# Patient Record
Sex: MALE | Race: WHITE | NOT HISPANIC OR LATINO | Employment: FULL TIME | URBAN - METROPOLITAN AREA
[De-identification: names, ages, dates, MRNs, and addresses within clinical notes are randomized per-mention and may not be internally consistent; named-entity substitution may affect disease eponyms.]

---

## 2019-08-08 ENCOUNTER — APPOINTMENT (OUTPATIENT)
Dept: GENERAL RADIOLOGY | Facility: CLINIC | Age: 29
End: 2019-08-08
Attending: EMERGENCY MEDICINE
Payer: COMMERCIAL

## 2019-08-08 ENCOUNTER — APPOINTMENT (OUTPATIENT)
Dept: ULTRASOUND IMAGING | Facility: CLINIC | Age: 29
End: 2019-08-08
Attending: EMERGENCY MEDICINE
Payer: COMMERCIAL

## 2019-08-08 ENCOUNTER — HOSPITAL ENCOUNTER (EMERGENCY)
Facility: CLINIC | Age: 29
Discharge: HOME | End: 2019-08-08
Attending: EMERGENCY MEDICINE | Admitting: EMERGENCY MEDICINE
Payer: COMMERCIAL

## 2019-08-08 VITALS
OXYGEN SATURATION: 100 % | DIASTOLIC BLOOD PRESSURE: 73 MMHG | RESPIRATION RATE: 16 BRPM | SYSTOLIC BLOOD PRESSURE: 112 MMHG | TEMPERATURE: 98.2 F | HEART RATE: 79 BPM

## 2019-08-08 DIAGNOSIS — N20.0 KIDNEY STONE: ICD-10-CM

## 2019-08-08 DIAGNOSIS — R10.9 FLANK PAIN: ICD-10-CM

## 2019-08-08 LAB
ALBUMIN SERPL-MCNC: 4.7 G/DL (ref 3.4–5)
ALBUMIN UR-MCNC: 20 MG/DL
ALP SERPL-CCNC: 97 U/L (ref 40–150)
ALT SERPL W P-5'-P-CCNC: 17 U/L (ref 0–70)
ANION GAP SERPL CALCULATED.3IONS-SCNC: 5 MMOL/L (ref 3–14)
APPEARANCE UR: CLEAR
AST SERPL W P-5'-P-CCNC: 12 U/L (ref 0–45)
BASOPHILS # BLD AUTO: 0 10E9/L (ref 0–0.2)
BASOPHILS NFR BLD AUTO: 0.4 %
BILIRUB SERPL-MCNC: 0.4 MG/DL (ref 0.2–1.3)
BILIRUB UR QL STRIP: NEGATIVE
BUN SERPL-MCNC: 16 MG/DL (ref 7–30)
CALCIUM SERPL-MCNC: 9.2 MG/DL (ref 8.5–10.1)
CHLORIDE SERPL-SCNC: 106 MMOL/L (ref 94–109)
CO2 SERPL-SCNC: 29 MMOL/L (ref 20–32)
COLOR UR AUTO: YELLOW
CREAT SERPL-MCNC: 0.84 MG/DL (ref 0.66–1.25)
DIFFERENTIAL METHOD BLD: NORMAL
EOSINOPHIL # BLD AUTO: 0.1 10E9/L (ref 0–0.7)
EOSINOPHIL NFR BLD AUTO: 0.9 %
ERYTHROCYTE [DISTWIDTH] IN BLOOD BY AUTOMATED COUNT: 11.9 % (ref 10–15)
GFR SERPL CREATININE-BSD FRML MDRD: >90 ML/MIN/{1.73_M2}
GLUCOSE SERPL-MCNC: 108 MG/DL (ref 70–99)
GLUCOSE UR STRIP-MCNC: NEGATIVE MG/DL
HCT VFR BLD AUTO: 47.4 % (ref 40–53)
HGB BLD-MCNC: 15.4 G/DL (ref 13.3–17.7)
HGB UR QL STRIP: ABNORMAL
IMM GRANULOCYTES # BLD: 0 10E9/L (ref 0–0.4)
IMM GRANULOCYTES NFR BLD: 0.4 %
KETONES UR STRIP-MCNC: NEGATIVE MG/DL
LEUKOCYTE ESTERASE UR QL STRIP: NEGATIVE
LIPASE SERPL-CCNC: 111 U/L (ref 73–393)
LYMPHOCYTES # BLD AUTO: 1.3 10E9/L (ref 0.8–5.3)
LYMPHOCYTES NFR BLD AUTO: 23.1 %
MCH RBC QN AUTO: 30.2 PG (ref 26.5–33)
MCHC RBC AUTO-ENTMCNC: 32.5 G/DL (ref 31.5–36.5)
MCV RBC AUTO: 93 FL (ref 78–100)
MONOCYTES # BLD AUTO: 0.4 10E9/L (ref 0–1.3)
MONOCYTES NFR BLD AUTO: 6.5 %
MUCOUS THREADS #/AREA URNS LPF: PRESENT /LPF
NEUTROPHILS # BLD AUTO: 3.8 10E9/L (ref 1.6–8.3)
NEUTROPHILS NFR BLD AUTO: 68.7 %
NITRATE UR QL: NEGATIVE
NRBC # BLD AUTO: 0 10*3/UL
NRBC BLD AUTO-RTO: 0 /100
PH UR STRIP: 5.5 PH (ref 5–7)
PLATELET # BLD AUTO: 165 10E9/L (ref 150–450)
POTASSIUM SERPL-SCNC: 3.6 MMOL/L (ref 3.4–5.3)
PROT SERPL-MCNC: 8.5 G/DL (ref 6.8–8.8)
RBC # BLD AUTO: 5.1 10E12/L (ref 4.4–5.9)
RBC #/AREA URNS AUTO: 3 /HPF (ref 0–2)
SODIUM SERPL-SCNC: 140 MMOL/L (ref 133–144)
SOURCE: ABNORMAL
SP GR UR STRIP: 1.03 (ref 1–1.03)
UROBILINOGEN UR STRIP-MCNC: NORMAL MG/DL (ref 0–2)
WBC # BLD AUTO: 5.6 10E9/L (ref 4–11)
WBC #/AREA URNS AUTO: 2 /HPF (ref 0–5)

## 2019-08-08 PROCEDURE — 85025 COMPLETE CBC W/AUTO DIFF WBC: CPT | Performed by: EMERGENCY MEDICINE

## 2019-08-08 PROCEDURE — 74018 RADEX ABDOMEN 1 VIEW: CPT

## 2019-08-08 PROCEDURE — 83690 ASSAY OF LIPASE: CPT | Performed by: EMERGENCY MEDICINE

## 2019-08-08 PROCEDURE — 99285 EMERGENCY DEPT VISIT HI MDM: CPT | Mod: 25

## 2019-08-08 PROCEDURE — 80053 COMPREHEN METABOLIC PANEL: CPT | Performed by: EMERGENCY MEDICINE

## 2019-08-08 PROCEDURE — 96361 HYDRATE IV INFUSION ADD-ON: CPT

## 2019-08-08 PROCEDURE — 81001 URINALYSIS AUTO W/SCOPE: CPT | Performed by: EMERGENCY MEDICINE

## 2019-08-08 PROCEDURE — 96375 TX/PRO/DX INJ NEW DRUG ADDON: CPT

## 2019-08-08 PROCEDURE — 25000128 H RX IP 250 OP 636: Performed by: EMERGENCY MEDICINE

## 2019-08-08 PROCEDURE — 76775 US EXAM ABDO BACK WALL LIM: CPT

## 2019-08-08 PROCEDURE — 96374 THER/PROPH/DIAG INJ IV PUSH: CPT

## 2019-08-08 RX ORDER — ONDANSETRON 2 MG/ML
4 INJECTION INTRAMUSCULAR; INTRAVENOUS
Status: COMPLETED | OUTPATIENT
Start: 2019-08-08 | End: 2019-08-08

## 2019-08-08 RX ORDER — MORPHINE SULFATE 4 MG/ML
4 INJECTION, SOLUTION INTRAMUSCULAR; INTRAVENOUS
Status: DISCONTINUED | OUTPATIENT
Start: 2019-08-08 | End: 2019-08-08 | Stop reason: HOSPADM

## 2019-08-08 RX ORDER — HYDROCODONE BITARTRATE AND ACETAMINOPHEN 5; 325 MG/1; MG/1
1 TABLET ORAL EVERY 6 HOURS PRN
Qty: 10 TABLET | Refills: 0 | Status: SHIPPED | OUTPATIENT
Start: 2019-08-08 | End: 2022-05-31

## 2019-08-08 RX ORDER — TAMSULOSIN HYDROCHLORIDE 0.4 MG/1
0.4 CAPSULE ORAL DAILY
Qty: 3 CAPSULE | Refills: 0 | Status: SHIPPED | OUTPATIENT
Start: 2019-08-08 | End: 2019-08-11

## 2019-08-08 RX ORDER — SODIUM CHLORIDE 9 MG/ML
1000 INJECTION, SOLUTION INTRAVENOUS CONTINUOUS
Status: DISCONTINUED | OUTPATIENT
Start: 2019-08-08 | End: 2019-08-08 | Stop reason: HOSPADM

## 2019-08-08 RX ADMIN — SODIUM CHLORIDE 1000 ML: 9 INJECTION, SOLUTION INTRAVENOUS at 10:23

## 2019-08-08 RX ADMIN — MORPHINE SULFATE 4 MG: 4 INJECTION INTRAVENOUS at 10:26

## 2019-08-08 RX ADMIN — ONDANSETRON 4 MG: 2 INJECTION INTRAMUSCULAR; INTRAVENOUS at 10:26

## 2019-08-08 ASSESSMENT — ENCOUNTER SYMPTOMS
CONSTIPATION: 0
FEVER: 0
DIARRHEA: 0
DIFFICULTY URINATING: 0
FLANK PAIN: 1
NAUSEA: 1
HEMATURIA: 0

## 2019-08-08 NOTE — ED PROVIDER NOTES
History     Chief Complaint:    Flank Pain      HPI   Oz Rabago is a 29 year old male who presents to the ED for evaluation of flank pain. The patient states that he developed left sided flank/back pain 2-3 hours prior to presenting consistent with his previous kidney stone. His pain persisted and caused him to be nauseated which ultimately prompted his visit to the ED. He otherwise denies any difficulty urinating, hematuria, cough, cold symptoms, fever, diarrhea, or constipation. He notes that he took some medication that started with a 'K' (ketorolac?) that his coworker gave him for kidney pain.     Allergies:  Penicillins     Medications:    The patient is currently on no regular medications.    Past Medical History:    Kidney stone    Past Surgical History:    Hernia repair    Family History:    Mother: HLD    Social History:  Negative for tobacco use.  Negative for alcohol use.     Review of Systems   Constitutional: Negative for fever.   Gastrointestinal: Positive for nausea. Negative for constipation and diarrhea.   Genitourinary: Positive for flank pain. Negative for difficulty urinating and hematuria.   All other systems reviewed and are negative.        Physical Exam   First Vitals:  BP: (!) 128/92  Pulse: 87  Heart Rate: 83  Temp: 98.2  F (36.8  C)  Resp: 16  SpO2: 99 %      Physical Exam  General: The patient is alert, in no respiratory distress. Sitting uncomfortably in the bed    HENT: Mucous membranes moist.    Cardiovascular: Regular rate and rhythm. Good pulses in all four extremities. Normal capillary refill and skin turgor.     Respiratory: Lungs are clear. No nasal flaring. No retractions. No wheezing, no crackles.    Gastrointestinal: Abdomen soft. No guarding, no rebound. No palpable hernias. No abdominal tenderness    Musculoskeletal: No gross deformity.     Skin: No rashes or petechiae.     Neurologic: The patient is alert and oriented x3. GCS 15. No testable cranial nerve deficit.  Follows commands with clear and appropriate speech. Gives appropriate answers. Good strength in all extremities. No gross neurologic deficit. Gross sensation intact. Pupils are round and reactive. No meningismus.     Lymphatic: No cervical adenopathy. No lower extremity swelling.    Psychiatric: The patient is non-tearful.  Emergency Department Course   Imaging:  Radiographic findings were communicated with the patient who voiced understanding of the findings.  XR Abdomen 1 View:   IMPRESSION: No abnormal calcifications project over the kidneys or  expected course of the ureters. Pelvic calcifications appear to  represent phleboliths. Moderate retained stool. Nonobstructive gas  pattern. Bones are unremarkable as per radiology.     US Renal Limited:  IMPRESSION: Mild left hydronephrosis. No obvious evidence of renal  calculi as per radiology.    Laboratory:  CBC: WBC: 5.6, HGB: 15.4, PLT: 165  CMP: Glucose 108 (H), o/w WNL (Creatinine: 0.84)    UA with Microscopic: Blood: small, , o/w WNL  Lipase: 111    Interventions:  1023 NS 1,000 mLs IV  1026 Morphine 4 mg IV   Zofran 4 mg IV    Emergency Department Course:  Nursing notes and vitals reviewed. (1005) I performed an exam of the patient as documented above. Discussed CT vs X-ray/US.    IV inserted. Medicine administered as documented above. Blood drawn. This was sent to the lab for further testing, results above.     The patient was sent for a abdomen x-ray and renal US while in the emergency department, findings above.     The patient provided a urine sample here in the emergency department. This was sent for laboratory testing, findings above.     1155 I rechecked the patient and discussed the results of his workup thus far. Patient reports that he thinks he passed the stone.     Findings and plan explained to the Patient. Patient discharged home with instructions regarding supportive care, medications, and reasons to return. The importance of close follow-up was  reviewed. The patient was prescribed Norco and Flomax.     I personally reviewed the laboratory results with the Patient and answered all related questions prior to discharge.   Impression & Plan    Medical Decision Making:  The patient has had a previous kidney stone and his current symptoms are similar. He reports dysuria with it. Urine shows a few RBCs. I held on CT scan as he has had one previously. I do not think this is likely a dissection, aneurysm, perforation, or diverticulitis. His US does show mild hydro. He said he did feel the stinging pass into the bladder, therefore he has likely passed the stone. I did give a prescription for Norco and Flomax in case this is incorrect and he was discharged home in good condition and feeling much better.       Diagnosis:    ICD-10-CM    1. Flank pain R10.9    2. Kidney stone N20.0        Disposition:  discharged to home    Discharge Medications:     Medication List      Started    HYDROcodone-acetaminophen 5-325 MG tablet  Commonly known as:  NORCO  1 tablet, Oral, EVERY 6 HOURS PRN     tamsulosin 0.4 MG capsule  Commonly known as:  FLOMAX  0.4 mg, Oral, DAILY          Scribe Disclosure:  I,  Nigel Villalobos, am serving as a scribe on 8/8/2019 at 10:05 AM to personally document services performed by Kendrick Davis MD based on my observations and the provider's statements to me.        Nigel Villalobos  8/8/2019   [facility] EMERGENCY DEPARTMENT       Kendrick Davis MD  08/08/19 [id number]

## 2019-08-08 NOTE — DISCHARGE INSTRUCTIONS
Discharge Instructions  Kidney Stones    Kidney stones are a common problem that can cause a lot of pain but fortunately are usually not dangerous and can be generally treated with medicine at home.  However, sometimes your condition may be worse than it seemed at first, or may get worse with time.     You need to follow-up with your regular doctor within 3 days.    Most kidney stones will pass on their own, but occasionally stones may need to be removed by an urologist. We will send you home with a urine strainer. Be sure to urinate into this, or urinate into a container and pour the urine through the fine filter to catch the kidney stone as it comes out. The stone will seem like a pebble or grain of sand. Be sure to save this in a Ziploc  bag and take it to the doctor s office with you.       Return to the Emergency Department if:  Your pain is not controlled.  You are vomiting and can t keep fluids or medications down.  You develop fever (>101).  You feel much more ill or develop new symptoms.  What can I do to help myself?  Be sure to drink plenty of fluids.  Staying active is good, and may help the stone to pass. You may do whatever you feel up to doing without restrictions.   Treatment:  Non-steroidal anti-inflammatory drugs (NSAIDs). This includes prescription medicines like Toradol  (ketorolac) and non-prescription medicines like Advil  (ibuprofen) and Nuprin  (ibuprofen). These pain relievers are very effective for kidney stones.  Narcotic pain pills. If you have been given a narcotic such as Vicodin  (hydrocodone with acetaminophen), Percocet  (oxycodone with acetaminophen), or codeine, do not drive for four hours after you have taken it. If the narcotic contains Tylenol  (acetaminophen), do not take Tylenol  with it. All narcotics will cause constipation, so eat a high fiber diet.    Nausea medication.  Nausea and vomiting are common with kidney stones, so your physician may send you home with medicine  for this.   Flomax  (tamsulosin). This medicine is sometimes used for men with prostate problems, but also can help kidney stones to pass. This medicine can lower blood pressure, and you may feel faint, especially when you first stand up. Be sure to get up gradually, sit down if you feel faint, and avoid activity where feeling faint would be dangerous, such as climbing ladders.   If you were given a prescription for medicine here today, be sure to read all of the information (including the package insert) that comes with your prescription.  This will include important information about the medicine, its side effects, and any warnings that you need to know about.  The pharmacist who fills the prescription can provide more information and answer questions you may have about the medicine.  If you have questions or concerns that the pharmacist cannot address, please call or return to the Emergency Department.   Opioid Medication Information    Pain medications are among the most commonly prescribed medicines, so we are including this information for all our patients. If you did not receive pain medication or get a prescription for pain medicine, you can ignore it.     You may have been given a prescription for an opioid (narcotic) pain medicine and/or have received a pain medicine while here in the Emergency Department. These medicines can make you drowsy or impaired. You must not drive, operate dangerous equipment, or engage in any other dangerous activities while taking these medications. If you drive while taking these medications, you could be arrested for DUI, or driving under the influence. Do not drink any alcohol while you are taking these medications.     Opioid pain medications can cause addiction. If you have a history of chemical dependency of any type, you are at a higher risk of becoming addicted to pain medications.  Only take these prescribed medications to treat your pain when all other options have  been tried. Take it for as short a time and as few doses as possible. Store your pain pills in a secure place, as they are frequently stolen and provide a dangerous opportunity for children or visitors in your house to start abusing these powerful medications. We will not replace any lost or stolen medicine.  As soon as your pain is better, you should flush all your remaining medication.     Many prescription pain medications contain Tylenol  (acetaminophen), including Vicodin , Tylenol #3 , Norco , Lortab , and Percocet .  You should not take any extra pills of Tylenol  if you are using these prescription medications or you can get very sick.  Do not ever take more than 3000 mg of acetaminophen in any 24 hour period.    All opioids tend to cause constipation. Drink plenty of water and eat foods that have a lot of fiber, such as fruits, vegetables, prune juice, apple juice and high fiber cereal.  Take a laxative if you don t move your bowels at least every other day. Miralax , Milk of Magnesia, Colace , or Senna  can be used to keep you regular.      Remember that you can always come back to the Emergency Department if you are not able to see your regular doctor in the amount of time listed above, if you get any new symptoms, or if there is anything that worries you.

## 2019-08-08 NOTE — ED TRIAGE NOTES
Pt with L flank pain for past few hours. Nausea, no vomiting or dysuria. Hx kidney stones, feels similar. ABC intact.

## 2019-08-08 NOTE — ED AVS SNAPSHOT
[facility]  [address]  [facility] [address]  Phone:  [phone]  Fax:  [phone]                                    Oz Rabago   MRN: [id number]    Department:  [facility]   Date of Visit:  8/8/2019           After Visit Summary Signature Page    I have received my discharge instructions, and my questions have been answered. I have discussed any challenges I see with this plan with the nurse or doctor.    ..........................................................................................................................................  Patient/Patient Representative Signature      ..........................................................................................................................................  Patient Representative Print Name and Relationship to Patient    ..................................................               ................................................  Date                                   Time    ..........................................................................................................................................  Reviewed by Signature/Title    ...................................................              ..............................................  Date                                               Time          22EPIC Rev 08/18

## 2019-09-10 DIAGNOSIS — R10.9 FLANK PAIN: Primary | ICD-10-CM

## 2019-09-10 ASSESSMENT — ENCOUNTER SYMPTOMS
SKIN CHANGES: 0
FLANK PAIN: 1
DIFFICULTY URINATING: 0
POOR WOUND HEALING: 1
DYSURIA: 0
HEMATURIA: 0
NAIL CHANGES: 0

## 2019-09-11 ENCOUNTER — OFFICE VISIT (OUTPATIENT)
Dept: UROLOGY | Facility: CLINIC | Age: 29
End: 2019-09-11
Payer: COMMERCIAL

## 2019-09-11 VITALS
HEART RATE: 67 BPM | DIASTOLIC BLOOD PRESSURE: 74 MMHG | SYSTOLIC BLOOD PRESSURE: 118 MMHG | WEIGHT: 136 LBS | HEIGHT: 70 IN | OXYGEN SATURATION: 98 % | BODY MASS INDEX: 19.47 KG/M2

## 2019-09-11 DIAGNOSIS — R10.9 FLANK PAIN: ICD-10-CM

## 2019-09-11 DIAGNOSIS — N20.0 KIDNEY STONE: Primary | ICD-10-CM

## 2019-09-11 DIAGNOSIS — N20.0 RECURRENT NEPHROLITHIASIS: ICD-10-CM

## 2019-09-11 LAB
ALBUMIN UR-MCNC: NEGATIVE MG/DL
APPEARANCE UR: CLEAR
BILIRUB UR QL STRIP: NEGATIVE
COLOR UR AUTO: YELLOW
GLUCOSE UR STRIP-MCNC: NEGATIVE MG/DL
HGB UR QL STRIP: NEGATIVE
KETONES UR STRIP-MCNC: NEGATIVE MG/DL
LEUKOCYTE ESTERASE UR QL STRIP: NEGATIVE
NITRATE UR QL: NEGATIVE
PH UR STRIP: 7 PH (ref 5–7)
SOURCE: NORMAL
SP GR UR STRIP: 1.02 (ref 1–1.03)
UROBILINOGEN UR STRIP-ACNC: 1 EU/DL (ref 0.2–1)

## 2019-09-11 PROCEDURE — 82365 CALCULUS SPECTROSCOPY: CPT | Mod: 90 | Performed by: UROLOGY

## 2019-09-11 PROCEDURE — 99000 SPECIMEN HANDLING OFFICE-LAB: CPT | Performed by: UROLOGY

## 2019-09-11 PROCEDURE — 99203 OFFICE O/P NEW LOW 30 MIN: CPT | Performed by: UROLOGY

## 2019-09-11 PROCEDURE — 81003 URINALYSIS AUTO W/O SCOPE: CPT | Performed by: UROLOGY

## 2019-09-11 ASSESSMENT — MIFFLIN-ST. JEOR: SCORE: 1593.14

## 2019-09-11 ASSESSMENT — PAIN SCALES - GENERAL: PAINLEVEL: NO PAIN (0)

## 2019-09-11 NOTE — LETTER
Date:September 12, 2019      Patient was self referred, no letter generated. Do not send.        [facility] Health Information

## 2019-09-11 NOTE — NURSING NOTE
"   Chief Complaint   Patient presents with     Hx of Stone     Patient her for follow after ED Visit       Blood pressure 118/74, pulse 67, height 1.778 m (5' 10\"), weight 61.7 kg (136 lb), SpO2 98 %. Body mass index is 19.51 kg/m .    There is no problem list on file for this patient.      Allergies   Allergen Reactions     Penicillins Rash       Current Outpatient Medications   Medication Sig Dispense Refill     HYDROcodone-acetaminophen (NORCO) 5-325 MG tablet Take 1 tablet by mouth every 6 hours as needed for severe pain (Patient not taking: Reported on 9/11/2019) 10 tablet 0       Social History     Tobacco Use     Smoking status: Never Smoker     Smokeless tobacco: Never Used   Substance Use Topics     Alcohol use: Not on file     Drug use: Not on file       NESSA Kelly  9/11/2019         "

## 2019-09-11 NOTE — LETTER
9/11/2019       RE: Oz Rabago  [address]  [facility] [address]     Dear Colleague,    Thank you for referring your patient, Oz Rabago, to the [facility] [place] at [facility]. Please see a copy of my visit note below.    Urology Consult History and Physical  [facility]  Name: Oz Rabago    MRN: [id number]   YOB: 1990       We were asked to see Oz Rabago at the request of ER follow up - SELF for evaluation and treatment of kidney stones.        Chief Complaint:   Kidney stones    History is obtained from the patient            History of Present Illness:   Oz Rabago is a 29 year old male who is being seen for evaluation of Kidney stones.     LEFT flank pain started on 8/8/19 at 6am and last for about 5 hours. He passed small stones while in the ER. U/S completed with mild left hydro, KUB without evidence of stone. He passed a few small - 1mm fragments which he brought in today.  No pain or issues since that time.     Passed a 8mm stone ~2 years ago. He also think he passed another stone prior. LEFT stone.   This was 95% Calcium phosphate              Past Medical History:   No past medical history on file.         Past Surgical History:   No past surgical history on file.         Social History:     Social History     Tobacco Use     Smoking status: Never Smoker     Smokeless tobacco: Never Used   Substance Use Topics     Alcohol use: Not on file       History   Smoking Status     Never Smoker   Smokeless Tobacco     Never Used            Family History:   No family history on file.           Allergies:     Allergies   Allergen Reactions     Penicillins Rash            Medications:     Current Outpatient Medications   Medication Sig     HYDROcodone-acetaminophen (NORCO) 5-325 MG tablet Take 1 tablet by mouth every 6 hours as needed for severe pain (Patient not taking: Reported on 9/11/2019)     No current  "facility-administered medications for this visit.              Review of Systems:     Skin: negative  Eyes: negative  Ears/Nose/Throat: negative  Respiratory: No shortness of breath, dyspnea on exertion, cough, or hemoptysis  Cardiovascular: negative  Gastrointestinal: negative  Genitourinary: as above  Musculoskeletal: negative  Neurologic: negative  Psychiatric: negative  Hematologic/Lymphatic/Immunologic: negative  Endocrine: negative          Physical Exam:     Patient Vitals for the past 24 hrs:   BP Pulse SpO2 Height Weight   09/11/19 1616 118/74 67 98 % 1.778 m (5' 10\") 61.7 kg (136 lb)     Body mass index is 19.51 kg/m .     General: age-appropriate appearing male in NAD  HEENT: Head AT/NC, EOMI, CN Grossly intact  Lungs: no respiratory distress, or pursed lip breathing  Heart: No obvious jugular venous distension present  Back: no bony midline tenderness, no CVAT bilaterally.  Abdomen: soft, non-distended, non-tender. No organomegaly  : No costovertebral tenderness bilaterally   Lymph: no palpable inguinal lymphadenopathy.  LE: no edema.   Musculoskeltal: extremities normal, no peripheral edema  Skin: no suspicious lesions or rashes  Neuro: Alert, oriented, speech and mentation normal;  moving all 4 extremities equally.  Psych: affect and mood normal          Data:   All laboratory data reviewed:    UA RESULTS:  Recent Labs   Lab Test 09/11/19  1628 08/08/19  1119   COLOR Yellow Yellow   APPEARANCE Clear Clear   URINEGLC Negative Negative   URINEBILI Negative Negative   URINEKETONE Negative Negative   SG 1.020 1.032   UBLD Negative Small*   URINEPH 7.0 5.5   PROTEIN Negative 20*   UROBILINOGEN 1.0  --    NITRITE Negative Negative   LEUKEST Negative Negative   RBCU  --  3*   WBCU  --  2        Lab Results   Component Value Date    CR 0.84 08/08/2019        All pertinent imaging reviewed:    All imaging studies reviewed by me.  I personally reviewed these imaging films.  A formal report from radiology will " follow.    U/S RENAL 8/8/19:  FINDINGS: Ultrasound evaluation of the left kidney is performed.  Kidney is normal in size measuring 11.6 x 5.7 x 5.6 cm. Renal cortical  echogenicity appears within normal limits. No obvious renal calculi.  Mild fluid distention of the renal pelvis is noted along with the  calyces consistent with mild hydronephrosis. Bladder is decompressed  but grossly unremarkable.                                                                      IMPRESSION: Mild left hydronephrosis. No obvious evidence of renal  Calculi.    KUB 8/8/19:  IMPRESSION: No abnormal calcifications project over the kidneys or  expected course of the ureters. Pelvic calcifications appear to  represent phleboliths. Moderate retained stool. Nonobstructive gas  pattern. Bones are unremarkable.         Impression and Plan:   Impression:   29 year old man with history of LEFT kidney stones and recent LEFT flank pain episode with passage of small stone fragments       Plan:   Recurrent nephrolithiasis  - U/A today normal indicating very low likelihood of residual stone fragments  - Stone analysis on the fragments he collected  - We discussed metabolic work up and he is interested in this  - Return in 2-3 months following TWO 24-hour [employer]     Thank you for the kind consultation.    Time spent: 30 minutes of which >50% was spent counseling.    Obdulio Fox MD   Urology  [facility]  [facility] Phone: [phone]  [facility] Phone: [phone]         Again, thank you for allowing me to participate in the care of your patient.      Sincerely,    Obdulio Fxo MD

## 2019-09-11 NOTE — PROGRESS NOTES
Urology Consult History and Physical  [facility]  Name: Oz Rabago    MRN: [id number]   YOB: 1990       We were asked to see Oz Rabago at the request of ER follow up - SELF for evaluation and treatment of kidney stones.        Chief Complaint:   Kidney stones    History is obtained from the patient            History of Present Illness:   Oz Rabago is a 29 year old male who is being seen for evaluation of Kidney stones.     LEFT flank pain started on 8/8/19 at 6am and last for about 5 hours. He passed small stones while in the ER. U/S completed with mild left hydro, KUB without evidence of stone. He passed a few small - 1mm fragments which he brought in today.  No pain or issues since that time.     Passed a 8mm stone ~2 years ago. He also think he passed another stone prior. LEFT stone.   This was 95% Calcium phosphate              Past Medical History:   No past medical history on file.         Past Surgical History:   No past surgical history on file.         Social History:     Social History     Tobacco Use     Smoking status: Never Smoker     Smokeless tobacco: Never Used   Substance Use Topics     Alcohol use: Not on file       History   Smoking Status     Never Smoker   Smokeless Tobacco     Never Used            Family History:   No family history on file.           Allergies:     Allergies   Allergen Reactions     Penicillins Rash            Medications:     Current Outpatient Medications   Medication Sig     HYDROcodone-acetaminophen (NORCO) 5-325 MG tablet Take 1 tablet by mouth every 6 hours as needed for severe pain (Patient not taking: Reported on 9/11/2019)     No current facility-administered medications for this visit.              Review of Systems:     Skin: negative  Eyes: negative  Ears/Nose/Throat: negative  Respiratory: No shortness of breath, dyspnea on exertion, cough, or hemoptysis  Cardiovascular: negative  Gastrointestinal: negative  Genitourinary: as  "above  Musculoskeletal: negative  Neurologic: negative  Psychiatric: negative  Hematologic/Lymphatic/Immunologic: negative  Endocrine: negative          Physical Exam:     Patient Vitals for the past 24 hrs:   BP Pulse SpO2 Height Weight   09/11/19 1616 118/74 67 98 % 1.778 m (5' 10\") 61.7 kg (136 lb)     Body mass index is 19.51 kg/m .     General: age-appropriate appearing male in NAD  HEENT: Head AT/NC, EOMI, CN Grossly intact  Lungs: no respiratory distress, or pursed lip breathing  Heart: No obvious jugular venous distension present  Back: no bony midline tenderness, no CVAT bilaterally.  Abdomen: soft, non-distended, non-tender. No organomegaly  : No costovertebral tenderness bilaterally   Lymph: no palpable inguinal lymphadenopathy.  LE: no edema.   Musculoskeltal: extremities normal, no peripheral edema  Skin: no suspicious lesions or rashes  Neuro: Alert, oriented, speech and mentation normal;  moving all 4 extremities equally.  Psych: affect and mood normal          Data:   All laboratory data reviewed:    UA RESULTS:  Recent Labs   Lab Test 09/11/19  1628 08/08/19  1119   COLOR Yellow Yellow   APPEARANCE Clear Clear   URINEGLC Negative Negative   URINEBILI Negative Negative   URINEKETONE Negative Negative   SG 1.020 1.032   UBLD Negative Small*   URINEPH 7.0 5.5   PROTEIN Negative 20*   UROBILINOGEN 1.0  --    NITRITE Negative Negative   LEUKEST Negative Negative   RBCU  --  3*   WBCU  --  2        Lab Results   Component Value Date    CR 0.84 08/08/2019        All pertinent imaging reviewed:    All imaging studies reviewed by me.  I personally reviewed these imaging films.  A formal report from radiology will follow.    U/S RENAL 8/8/19:  FINDINGS: Ultrasound evaluation of the left kidney is performed.  Kidney is normal in size measuring 11.6 x 5.7 x 5.6 cm. Renal cortical  echogenicity appears within normal limits. No obvious renal calculi.  Mild fluid distention of the renal pelvis is noted along " with the  calyces consistent with mild hydronephrosis. Bladder is decompressed  but grossly unremarkable.                                                                      IMPRESSION: Mild left hydronephrosis. No obvious evidence of renal  Calculi.    KUB 8/8/19:  IMPRESSION: No abnormal calcifications project over the kidneys or  expected course of the ureters. Pelvic calcifications appear to  represent phleboliths. Moderate retained stool. Nonobstructive gas  pattern. Bones are unremarkable.         Impression and Plan:   Impression:   29 year old man with history of LEFT kidney stones and recent LEFT flank pain episode with passage of small stone fragments       Plan:   Recurrent nephrolithiasis  - U/A today normal indicating very low likelihood of residual stone fragments  - Stone analysis on the fragments he collected  - We discussed metabolic work up and he is interested in this  - Return in 2-3 months following TWO 24-hour [employer]ol[employer]     Thank you for the kind consultation.    Time spent: 30 minutes of which >50% was spent counseling.    Obdulio Fox MD   Urology  [facility]  [facility] Phone: [phone]  [facility] Phone: [phone]

## 2019-09-14 LAB
APPEARANCE STONE: NORMAL
COMPN STONE: NORMAL
NUMBER STONE: NORMAL
SIZE STONE: NORMAL MM
WT STONE: 2 MG

## 2019-10-08 ENCOUNTER — TRANSFERRED RECORDS (OUTPATIENT)
Dept: HEALTH INFORMATION MANAGEMENT | Facility: CLINIC | Age: 29
End: 2019-10-08

## 2020-03-11 ENCOUNTER — HEALTH MAINTENANCE LETTER (OUTPATIENT)
Age: 30
End: 2020-03-11

## 2021-01-03 ENCOUNTER — HEALTH MAINTENANCE LETTER (OUTPATIENT)
Age: 31
End: 2021-01-03

## 2021-04-25 ENCOUNTER — HEALTH MAINTENANCE LETTER (OUTPATIENT)
Age: 31
End: 2021-04-25

## 2021-10-10 ENCOUNTER — HEALTH MAINTENANCE LETTER (OUTPATIENT)
Age: 31
End: 2021-10-10

## 2022-05-21 ENCOUNTER — HEALTH MAINTENANCE LETTER (OUTPATIENT)
Age: 32
End: 2022-05-21

## 2022-05-31 ENCOUNTER — OFFICE VISIT (OUTPATIENT)
Dept: FAMILY MEDICINE | Facility: CLINIC | Age: 32
End: 2022-05-31
Payer: COMMERCIAL

## 2022-05-31 VITALS
HEART RATE: 79 BPM | RESPIRATION RATE: 16 BRPM | SYSTOLIC BLOOD PRESSURE: 120 MMHG | DIASTOLIC BLOOD PRESSURE: 77 MMHG | TEMPERATURE: 97.7 F | HEIGHT: 70 IN | BODY MASS INDEX: 19.9 KG/M2 | WEIGHT: 139 LBS

## 2022-05-31 DIAGNOSIS — Z11.3 SCREEN FOR STD (SEXUALLY TRANSMITTED DISEASE): ICD-10-CM

## 2022-05-31 PROCEDURE — 87389 HIV-1 AG W/HIV-1&-2 AB AG IA: CPT | Performed by: FAMILY MEDICINE

## 2022-05-31 PROCEDURE — 87491 CHLMYD TRACH DNA AMP PROBE: CPT | Performed by: FAMILY MEDICINE

## 2022-05-31 PROCEDURE — 86803 HEPATITIS C AB TEST: CPT | Performed by: FAMILY MEDICINE

## 2022-05-31 PROCEDURE — 86780 TREPONEMA PALLIDUM: CPT | Performed by: FAMILY MEDICINE

## 2022-05-31 PROCEDURE — 87591 N.GONORRHOEAE DNA AMP PROB: CPT | Performed by: FAMILY MEDICINE

## 2022-05-31 PROCEDURE — 36415 COLL VENOUS BLD VENIPUNCTURE: CPT | Performed by: FAMILY MEDICINE

## 2022-05-31 PROCEDURE — 99203 OFFICE O/P NEW LOW 30 MIN: CPT | Performed by: FAMILY MEDICINE

## 2022-05-31 NOTE — PROGRESS NOTES
"  Assessment & Plan   See after visit summary and result note from studies for helpful information and advice given to patient.    Screen for STD (sexually transmitted disease)    - HIV Antigen Antibody Combo  - Hepatitis C Screen Reflex to HCV RNA Quant and Genotype  - Chlamydia trachomatis/Neisseria gonorrhoeae by PCR  - Treponema Abs w Reflex to RPR and Titer  - HIV Antigen Antibody Combo  - Hepatitis C Screen Reflex to HCV RNA Quant and Genotype                   Return in about 3 months (around 8/31/2022) for Routine preventive, with me, with any available provider.    Naldo Johnson DO  [facility] Clinton is a 32 year old who presents for the following health issues     STD    History of Present Illness       Reason for visit:  Std screening    He eats 2-3 servings of fruits and vegetables daily.He consumes 0 sweetened beverage(s) daily.He exercises with enough effort to increase his heart rate 9 or less minutes per day.  He exercises with enough effort to increase his heart rate 5 days per week.   He is taking medications regularly.             Review of Systems   Patient is seen for primary reason of wanting STD screening done.    Patient had urethral discomfort after voiding once about 10 days ago. This, plus never having had STD screening done brought him to clinic today.     No urinary concerns today.  No other physical or mental health concerns at time of exam.        Objective    /77 (BP Location: Right arm, Patient Position: Chair, Cuff Size: Adult Regular)   Pulse 79   Temp 97.7  F (36.5  C) (Oral)   Resp 16   Ht 1.778 m (5' 10\")   Wt 63 kg (139 lb)   BMI 19.94 kg/m    Body mass index is 19.94 kg/m .  Physical Exam   Vital signs reviewed.  Patient is in no acute appearing distress.  Breathing appears nonlabored.  Patient is alert and oriented ×3.  Patient is very pleasant, making good eye contact and responding with clear fluent speech.    Heart: Heart " rate is regular without murmur.    Lungs: Lungs are clear to auscultation with good airflow bilaterally.    Genital exam: No visible skin abnormalities.  No urethral discharge noted. No inguinal hernia palpated while standing during a cough.  No rash or abnormal skin or glans penis lesions noted.

## 2022-06-01 LAB
HCV AB SERPL QL IA: NONREACTIVE
HIV 1+2 AB+HIV1 P24 AG SERPL QL IA: NONREACTIVE
T PALLIDUM AB SER QL: NONREACTIVE

## 2022-06-02 LAB
C TRACH DNA SPEC QL PROBE+SIG AMP: NEGATIVE
N GONORRHOEA DNA SPEC QL NAA+PROBE: NEGATIVE

## 2022-06-04 ENCOUNTER — APPOINTMENT (OUTPATIENT)
Dept: ULTRASOUND IMAGING | Facility: CLINIC | Age: 32
End: 2022-06-04
Attending: EMERGENCY MEDICINE
Payer: COMMERCIAL

## 2022-06-04 ENCOUNTER — APPOINTMENT (OUTPATIENT)
Dept: CT IMAGING | Facility: CLINIC | Age: 32
End: 2022-06-04
Attending: EMERGENCY MEDICINE
Payer: COMMERCIAL

## 2022-06-04 ENCOUNTER — HOSPITAL ENCOUNTER (OUTPATIENT)
Facility: CLINIC | Age: 32
Setting detail: OBSERVATION
Discharge: HOME | End: 2022-06-05
Attending: EMERGENCY MEDICINE | Admitting: INTERNAL MEDICINE
Payer: COMMERCIAL

## 2022-06-04 DIAGNOSIS — N28.9 KIDNEY LESION, NATIVE, RIGHT: ICD-10-CM

## 2022-06-04 DIAGNOSIS — R10.9 FLANK PAIN: Primary | ICD-10-CM

## 2022-06-04 DIAGNOSIS — N20.1 URETEROLITHIASIS: ICD-10-CM

## 2022-06-04 LAB
ALBUMIN UR-MCNC: 20 MG/DL
AMORPH CRY #/AREA URNS HPF: ABNORMAL /HPF
ANION GAP SERPL CALCULATED.3IONS-SCNC: 7 MMOL/L (ref 3–14)
APPEARANCE UR: ABNORMAL
BACTERIA #/AREA URNS HPF: ABNORMAL /HPF
BASOPHILS # BLD AUTO: 0 10E3/UL (ref 0–0.2)
BASOPHILS NFR BLD AUTO: 0 %
BILIRUB UR QL STRIP: NEGATIVE
BUN SERPL-MCNC: 9 MG/DL (ref 7–30)
CALCIUM SERPL-MCNC: 8.9 MG/DL (ref 8.5–10.1)
CHLORIDE BLD-SCNC: 107 MMOL/L (ref 94–109)
CO2 SERPL-SCNC: 26 MMOL/L (ref 20–32)
COLOR UR AUTO: YELLOW
CREAT SERPL-MCNC: 0.71 MG/DL (ref 0.66–1.25)
EOSINOPHIL # BLD AUTO: 0 10E3/UL (ref 0–0.7)
EOSINOPHIL NFR BLD AUTO: 0 %
ERYTHROCYTE [DISTWIDTH] IN BLOOD BY AUTOMATED COUNT: 12.8 % (ref 10–15)
GFR SERPL CREATININE-BSD FRML MDRD: >90 ML/MIN/1.73M2
GLUCOSE BLD-MCNC: 130 MG/DL (ref 70–99)
GLUCOSE UR STRIP-MCNC: NEGATIVE MG/DL
HCT VFR BLD AUTO: 43.3 % (ref 40–53)
HGB BLD-MCNC: 14.1 G/DL (ref 13.3–17.7)
HGB UR QL STRIP: ABNORMAL
HOLD SPECIMEN: NORMAL
HOLD SPECIMEN: NORMAL
IMM GRANULOCYTES # BLD: 0 10E3/UL
IMM GRANULOCYTES NFR BLD: 0 %
KETONES UR STRIP-MCNC: 40 MG/DL
LEUKOCYTE ESTERASE UR QL STRIP: NEGATIVE
LYMPHOCYTES # BLD AUTO: 0.9 10E3/UL (ref 0.8–5.3)
LYMPHOCYTES NFR BLD AUTO: 12 %
MCH RBC QN AUTO: 30.6 PG (ref 26.5–33)
MCHC RBC AUTO-ENTMCNC: 32.6 G/DL (ref 31.5–36.5)
MCV RBC AUTO: 94 FL (ref 78–100)
MONOCYTES # BLD AUTO: 0.5 10E3/UL (ref 0–1.3)
MONOCYTES NFR BLD AUTO: 7 %
MUCOUS THREADS #/AREA URNS LPF: PRESENT /LPF
NEUTROPHILS # BLD AUTO: 5.9 10E3/UL (ref 1.6–8.3)
NEUTROPHILS NFR BLD AUTO: 81 %
NITRATE UR QL: NEGATIVE
NRBC # BLD AUTO: 0 10E3/UL
NRBC BLD AUTO-RTO: 0 /100
PH UR STRIP: 7.5 [PH] (ref 5–7)
PLATELET # BLD AUTO: 141 10E3/UL (ref 150–450)
POTASSIUM BLD-SCNC: 3.7 MMOL/L (ref 3.4–5.3)
RBC # BLD AUTO: 4.61 10E6/UL (ref 4.4–5.9)
RBC URINE: 16 /HPF
SARS-COV-2 RNA RESP QL NAA+PROBE: NEGATIVE
SODIUM SERPL-SCNC: 140 MMOL/L (ref 133–144)
SP GR UR STRIP: 1.02 (ref 1–1.03)
UROBILINOGEN UR STRIP-MCNC: NORMAL MG/DL
WBC # BLD AUTO: 7.4 10E3/UL (ref 4–11)
WBC URINE: 0 /HPF

## 2022-06-04 PROCEDURE — G0378 HOSPITAL OBSERVATION PER HR: HCPCS

## 2022-06-04 PROCEDURE — 74176 CT ABD & PELVIS W/O CONTRAST: CPT

## 2022-06-04 PROCEDURE — 99220 PR INITIAL OBSERVATION CARE,LEVEL III: CPT | Performed by: NURSE PRACTITIONER

## 2022-06-04 PROCEDURE — 96374 THER/PROPH/DIAG INJ IV PUSH: CPT

## 2022-06-04 PROCEDURE — 85014 HEMATOCRIT: CPT | Performed by: EMERGENCY MEDICINE

## 2022-06-04 PROCEDURE — 96376 TX/PRO/DX INJ SAME DRUG ADON: CPT

## 2022-06-04 PROCEDURE — 250N000011 HC RX IP 250 OP 636: Performed by: EMERGENCY MEDICINE

## 2022-06-04 PROCEDURE — 76770 US EXAM ABDO BACK WALL COMP: CPT

## 2022-06-04 PROCEDURE — 99285 EMERGENCY DEPT VISIT HI MDM: CPT | Mod: 25

## 2022-06-04 PROCEDURE — 36415 COLL VENOUS BLD VENIPUNCTURE: CPT | Performed by: EMERGENCY MEDICINE

## 2022-06-04 PROCEDURE — 258N000003 HC RX IP 258 OP 636: Performed by: NURSE PRACTITIONER

## 2022-06-04 PROCEDURE — C9803 HOPD COVID-19 SPEC COLLECT: HCPCS

## 2022-06-04 PROCEDURE — 250N000013 HC RX MED GY IP 250 OP 250 PS 637: Performed by: NURSE PRACTITIONER

## 2022-06-04 PROCEDURE — U0005 INFEC AGEN DETEC AMPLI PROBE: HCPCS | Performed by: EMERGENCY MEDICINE

## 2022-06-04 PROCEDURE — 96375 TX/PRO/DX INJ NEW DRUG ADDON: CPT

## 2022-06-04 PROCEDURE — 96361 HYDRATE IV INFUSION ADD-ON: CPT

## 2022-06-04 PROCEDURE — 258N000003 HC RX IP 258 OP 636: Performed by: EMERGENCY MEDICINE

## 2022-06-04 PROCEDURE — 81001 URINALYSIS AUTO W/SCOPE: CPT | Performed by: EMERGENCY MEDICINE

## 2022-06-04 PROCEDURE — 82310 ASSAY OF CALCIUM: CPT | Performed by: EMERGENCY MEDICINE

## 2022-06-04 RX ORDER — KETOROLAC TROMETHAMINE 15 MG/ML
15 INJECTION, SOLUTION INTRAMUSCULAR; INTRAVENOUS ONCE
Status: COMPLETED | OUTPATIENT
Start: 2022-06-04 | End: 2022-06-04

## 2022-06-04 RX ORDER — OXYCODONE HYDROCHLORIDE 5 MG/1
5-10 TABLET ORAL EVERY 4 HOURS PRN
Status: DISCONTINUED | OUTPATIENT
Start: 2022-06-04 | End: 2022-06-05 | Stop reason: HOSPADM

## 2022-06-04 RX ORDER — ONDANSETRON 2 MG/ML
4 INJECTION INTRAMUSCULAR; INTRAVENOUS EVERY 6 HOURS PRN
Status: DISCONTINUED | OUTPATIENT
Start: 2022-06-04 | End: 2022-06-05 | Stop reason: HOSPADM

## 2022-06-04 RX ORDER — TAMSULOSIN HYDROCHLORIDE 0.4 MG/1
0.4 CAPSULE ORAL DAILY
Status: DISCONTINUED | OUTPATIENT
Start: 2022-06-04 | End: 2022-06-05 | Stop reason: HOSPADM

## 2022-06-04 RX ORDER — SODIUM CHLORIDE 9 MG/ML
INJECTION, SOLUTION INTRAVENOUS CONTINUOUS
Status: DISCONTINUED | OUTPATIENT
Start: 2022-06-04 | End: 2022-06-05 | Stop reason: HOSPADM

## 2022-06-04 RX ORDER — NALOXONE HYDROCHLORIDE 0.4 MG/ML
0.2 INJECTION, SOLUTION INTRAMUSCULAR; INTRAVENOUS; SUBCUTANEOUS
Status: DISCONTINUED | OUTPATIENT
Start: 2022-06-04 | End: 2022-06-05 | Stop reason: HOSPADM

## 2022-06-04 RX ORDER — ACETAMINOPHEN 325 MG/1
650 TABLET ORAL EVERY 6 HOURS PRN
Status: DISCONTINUED | OUTPATIENT
Start: 2022-06-04 | End: 2022-06-05 | Stop reason: HOSPADM

## 2022-06-04 RX ORDER — KETOROLAC TROMETHAMINE 30 MG/ML
30 INJECTION, SOLUTION INTRAMUSCULAR; INTRAVENOUS EVERY 6 HOURS PRN
Status: DISCONTINUED | OUTPATIENT
Start: 2022-06-04 | End: 2022-06-05 | Stop reason: HOSPADM

## 2022-06-04 RX ORDER — NALOXONE HYDROCHLORIDE 0.4 MG/ML
0.4 INJECTION, SOLUTION INTRAMUSCULAR; INTRAVENOUS; SUBCUTANEOUS
Status: DISCONTINUED | OUTPATIENT
Start: 2022-06-04 | End: 2022-06-05 | Stop reason: HOSPADM

## 2022-06-04 RX ORDER — HYDROMORPHONE HCL IN WATER/PF 6 MG/30 ML
0.2 PATIENT CONTROLLED ANALGESIA SYRINGE INTRAVENOUS
Status: DISCONTINUED | OUTPATIENT
Start: 2022-06-04 | End: 2022-06-05 | Stop reason: HOSPADM

## 2022-06-04 RX ORDER — ONDANSETRON 2 MG/ML
4 INJECTION INTRAMUSCULAR; INTRAVENOUS EVERY 30 MIN PRN
Status: COMPLETED | OUTPATIENT
Start: 2022-06-04 | End: 2022-06-04

## 2022-06-04 RX ORDER — ONDANSETRON 4 MG/1
4 TABLET, ORALLY DISINTEGRATING ORAL EVERY 6 HOURS PRN
Status: DISCONTINUED | OUTPATIENT
Start: 2022-06-04 | End: 2022-06-05 | Stop reason: HOSPADM

## 2022-06-04 RX ORDER — AMOXICILLIN 250 MG
2 CAPSULE ORAL 2 TIMES DAILY
Status: DISCONTINUED | OUTPATIENT
Start: 2022-06-04 | End: 2022-06-05 | Stop reason: HOSPADM

## 2022-06-04 RX ORDER — ACETAMINOPHEN 650 MG/1
650 SUPPOSITORY RECTAL EVERY 6 HOURS PRN
Status: DISCONTINUED | OUTPATIENT
Start: 2022-06-04 | End: 2022-06-05 | Stop reason: HOSPADM

## 2022-06-04 RX ORDER — MORPHINE SULFATE 4 MG/ML
4 INJECTION, SOLUTION INTRAMUSCULAR; INTRAVENOUS
Status: COMPLETED | OUTPATIENT
Start: 2022-06-04 | End: 2022-06-04

## 2022-06-04 RX ORDER — AMOXICILLIN 250 MG
1 CAPSULE ORAL 2 TIMES DAILY
Status: DISCONTINUED | OUTPATIENT
Start: 2022-06-04 | End: 2022-06-05 | Stop reason: HOSPADM

## 2022-06-04 RX ADMIN — SODIUM CHLORIDE 1000 ML: 9 INJECTION, SOLUTION INTRAVENOUS at 11:55

## 2022-06-04 RX ADMIN — MORPHINE SULFATE 4 MG: 4 INJECTION INTRAVENOUS at 13:57

## 2022-06-04 RX ADMIN — SODIUM CHLORIDE: 9 INJECTION, SOLUTION INTRAVENOUS at 12:00

## 2022-06-04 RX ADMIN — ONDANSETRON 4 MG: 2 INJECTION INTRAMUSCULAR; INTRAVENOUS at 09:39

## 2022-06-04 RX ADMIN — SODIUM CHLORIDE 1000 ML: 9 INJECTION, SOLUTION INTRAVENOUS at 09:39

## 2022-06-04 RX ADMIN — OXYCODONE HYDROCHLORIDE 10 MG: 5 TABLET ORAL at 18:33

## 2022-06-04 RX ADMIN — ONDANSETRON 4 MG: 2 INJECTION INTRAMUSCULAR; INTRAVENOUS at 12:04

## 2022-06-04 RX ADMIN — MORPHINE SULFATE 4 MG: 4 INJECTION INTRAVENOUS at 11:55

## 2022-06-04 RX ADMIN — TAMSULOSIN HYDROCHLORIDE 0.4 MG: 0.4 CAPSULE ORAL at 17:26

## 2022-06-04 RX ADMIN — ONDANSETRON 4 MG: 2 INJECTION INTRAMUSCULAR; INTRAVENOUS at 13:57

## 2022-06-04 RX ADMIN — SODIUM CHLORIDE: 9 INJECTION, SOLUTION INTRAVENOUS at 20:21

## 2022-06-04 RX ADMIN — MORPHINE SULFATE 4 MG: 4 INJECTION INTRAVENOUS at 09:41

## 2022-06-04 RX ADMIN — KETOROLAC TROMETHAMINE 15 MG: 15 INJECTION, SOLUTION INTRAMUSCULAR; INTRAVENOUS at 09:40

## 2022-06-04 ASSESSMENT — ENCOUNTER SYMPTOMS
FLANK PAIN: 1
FEVER: 0
NAUSEA: 1
DYSURIA: 0
HEMATURIA: 0
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
## 2022-06-04 NOTE — ED TRIAGE NOTES
Pt c/o left flank pain that started about 0600.  Hx of kidney stones.   Also N/V.       Triage Assessment     Row Name 06/04/22 0827       Triage Assessment (Adult)    Airway WDL WDL       Respiratory WDL    Respiratory WDL WDL       Skin Circulation/Temperature WDL    Skin Circulation/Temperature WDL WDL       Cardiac WDL    Cardiac WDL WDL       Peripheral/Neurovascular WDL    Peripheral Neurovascular WDL WDL       Cognitive/Neuro/Behavioral WDL    Cognitive/Neuro/Behavioral WDL WDL
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
## 2022-06-04 NOTE — PHARMACY-ADMISSION MEDICATION HISTORY
Admission medication history interview status for this patient is complete. See [facility] admission navigator for allergy information, prior to admission medications and immunization status.     Medication history interview done, indicate source(s): Patient  Medication history resources (including written lists, pill bottles, clinic record): patient, Davida  Pharmacy: [facility]    Changes made to PTA medication list: None    Actions taken by pharmacist (provider contacted, etc):None     Additional medication history information:None    Medication reconciliation/reorder completed by provider prior to medication history?  N    Prior to Admission medications    Not on File

## 2022-06-04 NOTE — ED NOTES
[facility]  ED Nurse Handoff Report    Oz Rabago is a 32 year old male   ED Chief complaint: Flank Pain  . ED Diagnosis:   Final diagnoses:   Kidney lesion, native, right   Ureterolithiasis     Allergies:   Allergies   Allergen Reactions     Penicillins Rash       Code Status: Full Code  Activity level - Baseline/Home:  Independent. Activity Level - Current:   Independent. Lift room needed: No. Bariatric: No   Needed: No   Isolation: No. Infection: Not Applicable.     Vital Signs:   Vitals:    06/04/22 1215 06/04/22 1230 06/04/22 1330 06/04/22 1345   BP: 105/87 113/77 119/80    Pulse: 98 102  118   Resp: 10 12  20   Temp:       TempSrc:       SpO2: 96% 97%  100%   Weight:       Height:           Cardiac Rhythm:  ,      Pain level:    Patient confused: No. Patient Falls Risk: No.   Elimination Status: Has voided   Patient Report - Initial Complaint: Oz Rabago is a 32 year old male with history of renal calculi who presents with left flank pain. Per the patient, at work he began experiencing left flank pain accompanied by nausea which he describes as similar to his previous renal calculi. He denies fever as well as any testicular pain, dysuria or hematuria. He has had 3 previous calculi, 2 of which he passed and one required surgical intervention in hospital. Focused Assessment: C/o L flank pain and nausea, vomiting.   Tests Performed:   Abd/pelvis CT no contrast - Stone Protocol   Preliminary Result   IMPRESSION:    1.  4 mm stone at the left ureteropelvic junction with mild proximal hydronephrosis.   2.  1 mm nonobstructing stone in the lower left kidney.         US Renal Complete   Final Result   IMPRESSION:   1.  Mild dilatation of the left renal pelvis and proximal left ureter. No obstructing stone is visualized on this exam. Further evaluation with stone protocol CT may be helpful.   2.  Slightly complex likely benign upper right renal cortical cystic lesion. Recommend 6 month  ultrasound follow-up.      . Abnormal Results:   Labs Ordered and Resulted from Time of ED Arrival to Time of ED Departure   BASIC METABOLIC PANEL - Abnormal       Result Value    Sodium 140      Potassium 3.7      Chloride 107      Carbon Dioxide (CO2) 26      Anion Gap 7      Urea Nitrogen 9      Creatinine 0.71      Calcium 8.9      Glucose 130 (*)     GFR Estimate >90     ROUTINE UA WITH MICROSCOPIC REFLEX TO CULTURE - Abnormal    Color Urine Yellow      Appearance Urine Cloudy (*)     Glucose Urine Negative      Bilirubin Urine Negative      Ketones Urine 40  (*)     Specific Gravity Urine 1.023      Blood Urine Small (*)     pH Urine 7.5 (*)     Protein Albumin Urine 20  (*)     Urobilinogen Urine Normal      Nitrite Urine Negative      Leukocyte Esterase Urine Negative      Bacteria Urine Few (*)     Mucus Urine Present (*)     Amorphous Crystals Urine Few (*)     RBC Urine 16 (*)     WBC Urine 0     CBC WITH PLATELETS AND DIFFERENTIAL - Abnormal    WBC Count 7.4      RBC Count 4.61      Hemoglobin 14.1      Hematocrit 43.3      MCV 94      MCH 30.6      MCHC 32.6      RDW 12.8      Platelet Count 141 (*)     % Neutrophils 81      % Lymphocytes 12      % Monocytes 7      % Eosinophils 0      % Basophils 0      % Immature Granulocytes 0      NRBCs per 100 WBC 0      Absolute Neutrophils 5.9      Absolute Lymphocytes 0.9      Absolute Monocytes 0.5      Absolute Eosinophils 0.0      Absolute Basophils 0.0      Absolute Immature Granulocytes 0.0      Absolute NRBCs 0.0     .   Treatments provided: See MAR  Family Comments: wife at bedside for some of the time.   OBS brochure/video discussed/provided to patient:  Yes  ED Medications:   Medications   0.9% sodium chloride BOLUS (0 mLs Intravenous Stopped 6/4/22 1100)     Followed by   sodium chloride 0.9% infusion ( Intravenous New Bag 6/4/22 1200)   ondansetron (ZOFRAN) injection 4 mg (4 mg Intravenous Given 6/4/22 1357)   morphine (PF) injection 4 mg (4 mg  Intravenous Given 6/4/22 4377)   ketorolac (TORADOL) injection 15 mg (15 mg Intravenous Given 6/4/22 9447)     Drips infusing: YES  For the majority of the shift, the patient's behavior Green. Interventions performed were NA.    Sepsis treatment initiated: No     Patient tested for COVID 19 prior to admission: YES    ED Nurse Name/Phone Number: Leanne Escalante RN,   2:11 PM    RECEIVING UNIT ED HANDOFF REVIEW    Above ED Nurse Handoff Report was reviewed: Yes  Reviewed by: Merari Schulte RN on June 4, 2022 at 4:39 PM

## 2022-06-04 NOTE — PLAN OF CARE
ROOM # 213-02    Living Situation (if not independent, order SW consult): Independent   Facility name:   :  Sheryl Rabago Spouse [phone]     Activity level at baseline: Independent  Activity level on admit:  SBA    Who will be transporting you at discharge: Sheryl Potts Spouse [phone]       Patient registered to observation; given Patient Bill of Rights; given the opportunity to ask questions about observation status and their plan of care.  Patient has been oriented to the observation room, bathroom and call light is in place.    Discussed discharge goals and expectations with patient/family.

## 2022-06-05 VITALS
HEIGHT: 70 IN | TEMPERATURE: 97.9 F | HEART RATE: 76 BPM | SYSTOLIC BLOOD PRESSURE: 111 MMHG | DIASTOLIC BLOOD PRESSURE: 77 MMHG | RESPIRATION RATE: 16 BRPM | OXYGEN SATURATION: 99 % | WEIGHT: 139.6 LBS | BODY MASS INDEX: 19.98 KG/M2

## 2022-06-05 LAB
ANION GAP SERPL CALCULATED.3IONS-SCNC: 3 MMOL/L (ref 3–14)
BUN SERPL-MCNC: 6 MG/DL (ref 7–30)
CALCIUM SERPL-MCNC: 8.2 MG/DL (ref 8.5–10.1)
CHLORIDE BLD-SCNC: 109 MMOL/L (ref 94–109)
CO2 SERPL-SCNC: 28 MMOL/L (ref 20–32)
CREAT SERPL-MCNC: 0.66 MG/DL (ref 0.66–1.25)
ERYTHROCYTE [DISTWIDTH] IN BLOOD BY AUTOMATED COUNT: 12.9 % (ref 10–15)
GFR SERPL CREATININE-BSD FRML MDRD: >90 ML/MIN/1.73M2
GLUCOSE BLD-MCNC: 99 MG/DL (ref 70–99)
HCT VFR BLD AUTO: 38.9 % (ref 40–53)
HGB BLD-MCNC: 12.2 G/DL (ref 13.3–17.7)
MCH RBC QN AUTO: 30.5 PG (ref 26.5–33)
MCHC RBC AUTO-ENTMCNC: 31.4 G/DL (ref 31.5–36.5)
MCV RBC AUTO: 97 FL (ref 78–100)
PLATELET # BLD AUTO: 101 10E3/UL (ref 150–450)
POTASSIUM BLD-SCNC: 4 MMOL/L (ref 3.4–5.3)
RBC # BLD AUTO: 4 10E6/UL (ref 4.4–5.9)
SODIUM SERPL-SCNC: 140 MMOL/L (ref 133–144)
WBC # BLD AUTO: 6 10E3/UL (ref 4–11)

## 2022-06-05 PROCEDURE — G0378 HOSPITAL OBSERVATION PER HR: HCPCS

## 2022-06-05 PROCEDURE — 85014 HEMATOCRIT: CPT | Performed by: NURSE PRACTITIONER

## 2022-06-05 PROCEDURE — 250N000013 HC RX MED GY IP 250 OP 250 PS 637: Performed by: NURSE PRACTITIONER

## 2022-06-05 PROCEDURE — 258N000003 HC RX IP 258 OP 636: Performed by: NURSE PRACTITIONER

## 2022-06-05 PROCEDURE — 80048 BASIC METABOLIC PNL TOTAL CA: CPT | Performed by: NURSE PRACTITIONER

## 2022-06-05 PROCEDURE — 36415 COLL VENOUS BLD VENIPUNCTURE: CPT | Performed by: NURSE PRACTITIONER

## 2022-06-05 PROCEDURE — 99217 PR OBSERVATION CARE DISCHARGE: CPT | Performed by: PHYSICIAN ASSISTANT

## 2022-06-05 PROCEDURE — 96361 HYDRATE IV INFUSION ADD-ON: CPT

## 2022-06-05 RX ORDER — ACETAMINOPHEN 325 MG/1
650 TABLET ORAL EVERY 6 HOURS PRN
Start: 2022-06-05

## 2022-06-05 RX ORDER — TAMSULOSIN HYDROCHLORIDE 0.4 MG/1
0.4 CAPSULE ORAL DAILY
Qty: 7 CAPSULE | Refills: 0 | Status: SHIPPED | OUTPATIENT
Start: 2022-06-05 | End: 2022-06-12

## 2022-06-05 RX ADMIN — TAMSULOSIN HYDROCHLORIDE 0.4 MG: 0.4 CAPSULE ORAL at 08:36

## 2022-06-05 RX ADMIN — SODIUM CHLORIDE: 9 INJECTION, SOLUTION INTRAVENOUS at 03:09

## 2022-06-05 NOTE — PLAN OF CARE
"PRIMARY DIAGNOSIS: LEFT URETERAL STONE    OUTPATIENT/OBSERVATION GOALS TO BE MET BEFORE DISCHARGE  1. Pain Status: Pain free.    2. Tolerating adequate PO diet: Yes    3. Surgical Intervention planned: No    4. Cleared by consultants (if involved): No    5. Return to near baseline physical activity: Yes    Discharge Planner Nurse   Safe discharge environment identified: Yes  Barriers to discharge: Yes       Entered by: Malachi Parekh RN 06/05/2022 12:42 AM  /77 (BP Location: Left arm)   Pulse 97   Temp 97.9  F (36.6  C) (Oral)   Resp 16   Ht 1.778 m (5' 10\")   Wt 63.3 kg (139 lb 9.6 oz)   SpO2 99%   BMI 20.03 kg/m    VSS on RA. AxOx4 and able to make needs known. Ambulating in room independently. Tolerating clear liquids. NS running @ 150mL/hr. Manish pain. Plan to monitor for stone passing, administer IVF and ADAT while managing pain and nausea. Pt is agreeable w/ POC and resting peacefully. Will continue to provide supportive cares.  Please review provider order for any additional goals.   Nurse to notify provider when observation goals have been met and patient is ready for discharge.  "

## 2022-06-05 NOTE — PLAN OF CARE
"PRIMARY DIAGNOSIS: LEFT URETERAL STONE    OUTPATIENT/OBSERVATION GOALS TO BE MET BEFORE DISCHARGE  1. Pain Status: Pain free.    2. Tolerating adequate PO diet: Yes, Clears ADAT    3. Surgical Intervention planned: No    4. Cleared by consultants (if involved): No    5. Return to near baseline physical activity: Yes    Discharge Planner Nurse   Safe discharge environment identified: Yes  Barriers to discharge: Yes       Entered by: Malachi Parekh RN 06/04/2022 8:35 PM  /81 (BP Location: Right arm)   Pulse 89   Temp 97.9  F (36.6  C) (Oral)   Resp 12   Ht 1.778 m (5' 10\")   Wt 63.3 kg (139 lb 9.6 oz)   SpO2 100%   BMI 20.03 kg/m    VSS on RA. AxOx4 and able to make needs known. Ambulating in room independently. Tolerating clear liquids. NS running @ 150mL/hr. Manish pain. Pt reported that he felt he had passed a stone, writer witnessed an approx 1mm redish white dot in the strainer. Writer attempted to collect sample but was unsuccessful as matter passed through strainer. Pt stated that he no longer experienced left flank pain/pressure, will ocntinue to monitor. Plan to monitor for stone passing, administer IVF and ADAT while managing pain and nausea. Pt and wife are agreeable w/ POC. Will continue to provide supportive cares.  Please review provider order for any additional goals.   Nurse to notify provider when observation goals have been met and patient is ready for discharge.  "

## 2022-06-05 NOTE — PLAN OF CARE
Patient's After Visit Summary was reviewed with patient and/or spouse.   Patient verbalized understanding of After Visit Summary, recommended follow up and was given an opportunity to ask questions.   Discharge medications sent home with patient/family: No (E-scribed to patient's pharmacy of choice)  Discharged with spouse.    IV access discontinued. All personal belongings returned.     OBSERVATION patient END time: 10:00 AM

## 2022-06-05 NOTE — PLAN OF CARE
"PRIMARY DIAGNOSIS: LEFT URETERAL STONE    OUTPATIENT/OBSERVATION GOALS TO BE MET BEFORE DISCHARGE  1. Pain Status: Pain free.    2. Tolerating adequate PO diet: Yes    3. Surgical Intervention planned: No    4. Cleared by consultants (if involved): No    5. Return to near baseline physical activity: Yes    Discharge Planner Nurse   Safe discharge environment identified: Yes  Barriers to discharge: Yes       Entered by: Malachi Parekh RN 06/05/2022 3:31 AM  /77 (BP Location: Right arm)   Pulse 87   Temp 97.9  F (36.6  C) (Oral)   Resp 16   Ht 1.778 m (5' 10\")   Wt 63.3 kg (139 lb 9.6 oz)   SpO2 97%   BMI 20.03 kg/m    VSS on RA. AxOx4 and able to make needs known. Ambulating in room independently. Tolerating clear liquids. NS running @ 150mL/hr. Denies pain or nausea. Voiding w/o difficulty w/ moderate output (see Flowsheets). Plan to monitor for stone passing, administer IVF and ADAT while managing pain and nausea. Pt is agreeable w/ POC and resting peacefully. Will continue to provide supportive cares.  Please review provider order for any additional goals.   Nurse to notify provider when observation goals have been met and patient is ready for discharge.  "

## 2022-06-05 NOTE — PLAN OF CARE
"PRIMARY DIAGNOSIS: LEFT URETERAL STONE     OUTPATIENT/OBSERVATION GOALS TO BE MET BEFORE DISCHARGE  1. Pain Status: Pain free.     2. Tolerating adequate PO diet: Yes     3. Surgical Intervention planned: No     4. Cleared by consultants (if involved): N/A     5. Return to near baseline physical activity: Yes     Discharge Planner Nurse   Safe discharge environment identified: Yes  Barriers to discharge: No  /77 (BP Location: Right arm)   Pulse 76   Temp 97.9  F (36.6  C) (Oral)   Resp 16   Ht 1.778 m (5' 10\")   Wt 63.3 kg (139 lb 9.6 oz)   SpO2 99%   BMI 20.03 kg/m    A&Ox4. Up independently. BS active x4, tolerating clear liquids, passing flatus. Denies nausea, states he has an appetite this AM. Denies pain. Voiding in adequate amt's. Plan to discharge to home today.   Please review provider order for any additional goals.   Nurse to notify provider when observation goals have been met and patient is ready for discharge.  "
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
## 2022-09-18 ENCOUNTER — HEALTH MAINTENANCE LETTER (OUTPATIENT)
Age: 32
End: 2022-09-18

## 2023-06-04 ENCOUNTER — HEALTH MAINTENANCE LETTER (OUTPATIENT)
Age: 33
End: 2023-06-04

## 2024-05-09 ENCOUNTER — APPOINTMENT (OUTPATIENT)
Dept: GENERAL RADIOLOGY | Facility: CLINIC | Age: 34
End: 2024-05-09
Attending: EMERGENCY MEDICINE
Payer: COMMERCIAL

## 2024-05-09 ENCOUNTER — APPOINTMENT (OUTPATIENT)
Dept: ULTRASOUND IMAGING | Facility: CLINIC | Age: 34
End: 2024-05-09
Attending: EMERGENCY MEDICINE
Payer: COMMERCIAL

## 2024-05-09 ENCOUNTER — HOSPITAL ENCOUNTER (EMERGENCY)
Facility: CLINIC | Age: 34
Discharge: HOME | End: 2024-05-09
Attending: EMERGENCY MEDICINE | Admitting: EMERGENCY MEDICINE
Payer: COMMERCIAL

## 2024-05-09 VITALS
DIASTOLIC BLOOD PRESSURE: 61 MMHG | WEIGHT: 162 LBS | HEART RATE: 80 BPM | OXYGEN SATURATION: 96 % | TEMPERATURE: 98 F | HEIGHT: 69 IN | SYSTOLIC BLOOD PRESSURE: 99 MMHG | BODY MASS INDEX: 23.99 KG/M2 | RESPIRATION RATE: 16 BRPM

## 2024-05-09 DIAGNOSIS — N20.0 NEPHROLITHIASIS: ICD-10-CM

## 2024-05-09 DIAGNOSIS — R10.9 LEFT FLANK PAIN: ICD-10-CM

## 2024-05-09 LAB
ALBUMIN UR-MCNC: 10 MG/DL
AMORPH CRY #/AREA URNS HPF: ABNORMAL /HPF
ANION GAP SERPL CALCULATED.3IONS-SCNC: 12 MMOL/L (ref 7–15)
APPEARANCE UR: ABNORMAL
BACTERIA #/AREA URNS HPF: ABNORMAL /HPF
BASOPHILS # BLD AUTO: 0 10E3/UL (ref 0–0.2)
BASOPHILS NFR BLD AUTO: 0 %
BILIRUB UR QL STRIP: NEGATIVE
BUN SERPL-MCNC: 19.2 MG/DL (ref 6–20)
CALCIUM SERPL-MCNC: 8.6 MG/DL (ref 8.6–10)
CHLORIDE SERPL-SCNC: 103 MMOL/L (ref 98–107)
COLOR UR AUTO: YELLOW
CREAT SERPL-MCNC: 0.88 MG/DL (ref 0.67–1.17)
DEPRECATED HCO3 PLAS-SCNC: 24 MMOL/L (ref 22–29)
EGFRCR SERPLBLD CKD-EPI 2021: >90 ML/MIN/1.73M2
EOSINOPHIL # BLD AUTO: 0 10E3/UL (ref 0–0.7)
EOSINOPHIL NFR BLD AUTO: 1 %
ERYTHROCYTE [DISTWIDTH] IN BLOOD BY AUTOMATED COUNT: 12 % (ref 10–15)
GLUCOSE SERPL-MCNC: 111 MG/DL (ref 70–99)
GLUCOSE UR STRIP-MCNC: NEGATIVE MG/DL
HCT VFR BLD AUTO: 45.2 % (ref 40–53)
HGB BLD-MCNC: 14.9 G/DL (ref 13.3–17.7)
HGB UR QL STRIP: NEGATIVE
HOLD SPECIMEN: NORMAL
HOLD SPECIMEN: NORMAL
IMM GRANULOCYTES # BLD: 0 10E3/UL
IMM GRANULOCYTES NFR BLD: 0 %
KETONES UR STRIP-MCNC: NEGATIVE MG/DL
LEUKOCYTE ESTERASE UR QL STRIP: NEGATIVE
LYMPHOCYTES # BLD AUTO: 1.1 10E3/UL (ref 0.8–5.3)
LYMPHOCYTES NFR BLD AUTO: 14 %
MCH RBC QN AUTO: 30 PG (ref 26.5–33)
MCHC RBC AUTO-ENTMCNC: 33 G/DL (ref 31.5–36.5)
MCV RBC AUTO: 91 FL (ref 78–100)
MONOCYTES # BLD AUTO: 0.5 10E3/UL (ref 0–1.3)
MONOCYTES NFR BLD AUTO: 6 %
MUCOUS THREADS #/AREA URNS LPF: PRESENT /LPF
NEUTROPHILS # BLD AUTO: 6.2 10E3/UL (ref 1.6–8.3)
NEUTROPHILS NFR BLD AUTO: 79 %
NITRATE UR QL: NEGATIVE
NRBC # BLD AUTO: 0 10E3/UL
NRBC BLD AUTO-RTO: 0 /100
PH UR STRIP: 7 [PH] (ref 5–7)
PLATELET # BLD AUTO: 218 10E3/UL (ref 150–450)
POTASSIUM SERPL-SCNC: 4.3 MMOL/L (ref 3.4–5.3)
RBC # BLD AUTO: 4.96 10E6/UL (ref 4.4–5.9)
RBC URINE: 9 /HPF
SODIUM SERPL-SCNC: 139 MMOL/L (ref 135–145)
SP GR UR STRIP: 1.02 (ref 1–1.03)
UROBILINOGEN UR STRIP-MCNC: NORMAL MG/DL
WBC # BLD AUTO: 7.8 10E3/UL (ref 4–11)
WBC URINE: <1 /HPF

## 2024-05-09 PROCEDURE — 99285 EMERGENCY DEPT VISIT HI MDM: CPT | Mod: 25

## 2024-05-09 PROCEDURE — 85004 AUTOMATED DIFF WBC COUNT: CPT | Performed by: EMERGENCY MEDICINE

## 2024-05-09 PROCEDURE — 80048 BASIC METABOLIC PNL TOTAL CA: CPT | Performed by: EMERGENCY MEDICINE

## 2024-05-09 PROCEDURE — 250N000011 HC RX IP 250 OP 636: Performed by: EMERGENCY MEDICINE

## 2024-05-09 PROCEDURE — 74018 RADEX ABDOMEN 1 VIEW: CPT

## 2024-05-09 PROCEDURE — 96374 THER/PROPH/DIAG INJ IV PUSH: CPT

## 2024-05-09 PROCEDURE — 36415 COLL VENOUS BLD VENIPUNCTURE: CPT | Performed by: EMERGENCY MEDICINE

## 2024-05-09 PROCEDURE — 258N000003 HC RX IP 258 OP 636: Performed by: EMERGENCY MEDICINE

## 2024-05-09 PROCEDURE — 96375 TX/PRO/DX INJ NEW DRUG ADDON: CPT

## 2024-05-09 PROCEDURE — 96361 HYDRATE IV INFUSION ADD-ON: CPT

## 2024-05-09 PROCEDURE — 81001 URINALYSIS AUTO W/SCOPE: CPT | Performed by: EMERGENCY MEDICINE

## 2024-05-09 PROCEDURE — 76770 US EXAM ABDO BACK WALL COMP: CPT

## 2024-05-09 RX ORDER — ONDANSETRON 2 MG/ML
4 INJECTION INTRAMUSCULAR; INTRAVENOUS ONCE
Status: COMPLETED | OUTPATIENT
Start: 2024-05-09 | End: 2024-05-09

## 2024-05-09 RX ORDER — HYDROCODONE BITARTRATE AND ACETAMINOPHEN 5; 325 MG/1; MG/1
1 TABLET ORAL EVERY 6 HOURS PRN
Qty: 6 TABLET | Refills: 0 | Status: SHIPPED | OUTPATIENT
Start: 2024-05-09

## 2024-05-09 RX ORDER — DIPHENHYDRAMINE HYDROCHLORIDE 50 MG/ML
25 INJECTION INTRAMUSCULAR; INTRAVENOUS ONCE
Status: COMPLETED | OUTPATIENT
Start: 2024-05-09 | End: 2024-05-09

## 2024-05-09 RX ORDER — KETOROLAC TROMETHAMINE 15 MG/ML
15 INJECTION, SOLUTION INTRAMUSCULAR; INTRAVENOUS ONCE
Status: COMPLETED | OUTPATIENT
Start: 2024-05-09 | End: 2024-05-09

## 2024-05-09 RX ORDER — SODIUM CHLORIDE 9 MG/ML
INJECTION, SOLUTION INTRAVENOUS CONTINUOUS
Status: DISCONTINUED | OUTPATIENT
Start: 2024-05-09 | End: 2024-05-09 | Stop reason: HOSPADM

## 2024-05-09 RX ORDER — TAMSULOSIN HYDROCHLORIDE 0.4 MG/1
0.4 CAPSULE ORAL DAILY
Qty: 3 CAPSULE | Refills: 0 | Status: SHIPPED | OUTPATIENT
Start: 2024-05-09 | End: 2024-05-12

## 2024-05-09 RX ADMIN — DIPHENHYDRAMINE HYDROCHLORIDE 25 MG: 50 INJECTION, SOLUTION INTRAMUSCULAR; INTRAVENOUS at 11:22

## 2024-05-09 RX ADMIN — KETOROLAC TROMETHAMINE 15 MG: 15 INJECTION, SOLUTION INTRAMUSCULAR; INTRAVENOUS at 10:11

## 2024-05-09 RX ADMIN — PROCHLORPERAZINE EDISYLATE 10 MG: 5 INJECTION INTRAMUSCULAR; INTRAVENOUS at 11:23

## 2024-05-09 RX ADMIN — HYDROMORPHONE HYDROCHLORIDE 1 MG: 1 INJECTION, SOLUTION INTRAMUSCULAR; INTRAVENOUS; SUBCUTANEOUS at 10:12

## 2024-05-09 RX ADMIN — SODIUM CHLORIDE 1000 ML: 9 INJECTION, SOLUTION INTRAVENOUS at 10:12

## 2024-05-09 RX ADMIN — ONDANSETRON 4 MG: 2 INJECTION INTRAMUSCULAR; INTRAVENOUS at 10:12

## 2024-05-09 ASSESSMENT — ACTIVITIES OF DAILY LIVING (ADL)
ADLS_ACUITY_SCORE: 35

## 2024-05-09 ASSESSMENT — COLUMBIA-SUICIDE SEVERITY RATING SCALE - C-SSRS
2. HAVE YOU ACTUALLY HAD ANY THOUGHTS OF KILLING YOURSELF IN THE PAST MONTH?: NO
6. HAVE YOU EVER DONE ANYTHING, STARTED TO DO ANYTHING, OR PREPARED TO DO ANYTHING TO END YOUR LIFE?: NO
1. IN THE PAST MONTH, HAVE YOU WISHED YOU WERE DEAD OR WISHED YOU COULD GO TO SLEEP AND NOT WAKE UP?: NO
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
## 2024-05-09 NOTE — ED TRIAGE NOTES
Left-sided flank pain for 3 hours. Hx of kidney stones 2 years ago. Nothing for pain today. Endorses nausea. ABCs intact. A&OX4.     Triage Assessment (Adult)       Row Name 05/09/24 0932          Triage Assessment    Airway WDL WDL        Respiratory WDL    Respiratory WDL WDL        Skin Circulation/Temperature WDL    Skin Circulation/Temperature WDL WDL        Cardiac WDL    Cardiac WDL WDL        Peripheral/Neurovascular WDL    Peripheral Neurovascular WDL WDL        Cognitive/Neuro/Behavioral WDL    Cognitive/Neuro/Behavioral WDL WDL

## 2024-07-14 ENCOUNTER — HEALTH MAINTENANCE LETTER (OUTPATIENT)
Age: 34
End: 2024-07-14

## 2025-07-19 ENCOUNTER — HEALTH MAINTENANCE LETTER (OUTPATIENT)
Age: 35
End: 2025-07-19